# Patient Record
Sex: FEMALE | Race: ASIAN
[De-identification: names, ages, dates, MRNs, and addresses within clinical notes are randomized per-mention and may not be internally consistent; named-entity substitution may affect disease eponyms.]

---

## 2023-06-08 ENCOUNTER — APPOINTMENT (OUTPATIENT)
Dept: OTOLARYNGOLOGY | Facility: CLINIC | Age: 48
End: 2023-06-08
Payer: COMMERCIAL

## 2023-06-08 VITALS
DIASTOLIC BLOOD PRESSURE: 72 MMHG | SYSTOLIC BLOOD PRESSURE: 105 MMHG | HEIGHT: 65 IN | WEIGHT: 126 LBS | BODY MASS INDEX: 20.99 KG/M2 | HEART RATE: 64 BPM | TEMPERATURE: 96 F

## 2023-06-08 DIAGNOSIS — M54.2 CERVICALGIA: ICD-10-CM

## 2023-06-08 DIAGNOSIS — K11.20 SIALOADENITIS, UNSPECIFIED: ICD-10-CM

## 2023-06-08 PROBLEM — Z00.00 ENCOUNTER FOR PREVENTIVE HEALTH EXAMINATION: Status: ACTIVE | Noted: 2023-06-08

## 2023-06-08 PROCEDURE — 99204 OFFICE O/P NEW MOD 45 MIN: CPT

## 2023-06-08 NOTE — ASSESSMENT
[FreeTextEntry1] : 47 year old female presents with right neck pain x 1 year. On exam, there was evidence of crepitus of TMJ bilaterally otherwise normal exam. Based on her history and exam findings, I believe her symptoms are consistent with sialadenitis. I have provided our Sialadenitis handout which includes management such as warm compresses and increased hydration. Follow up 3 months for repeat evaluation. Will get Neck US if symptoms persist or worsen.\par \par - Sialadenitis handout\par - fu 3 months, US Neck if symptoms persist

## 2023-06-08 NOTE — HISTORY OF PRESENT ILLNESS
[de-identified] : 6/8/23\par 47F presents with right neck pain for 1 year. Pain is intermittent and only occurs with palpation of the area. Over the past 2 days she has noted a discomfort in this area which has been more constant.  No masses or swelling. She had dental work about 3 years ago in that general area and she believes that the pain started afterwards. She also reports right ear pain when she's flying. Denies hearing changes, tinnitus, otorrhea, or vertiginous symptoms.  No fevers, night sweats, weight loss. No hx of TMJ.

## 2023-06-08 NOTE — PHYSICAL EXAM
[Midline] : trachea located in midline position [Normal] : no rashes [de-identified] : + crepitus

## 2023-11-09 ENCOUNTER — TRANSCRIPTION ENCOUNTER (OUTPATIENT)
Age: 48
End: 2023-11-09